# Patient Record
Sex: MALE | Race: WHITE
[De-identification: names, ages, dates, MRNs, and addresses within clinical notes are randomized per-mention and may not be internally consistent; named-entity substitution may affect disease eponyms.]

---

## 2019-07-24 ENCOUNTER — HOSPITAL ENCOUNTER (EMERGENCY)
Dept: HOSPITAL 44 - ED | Age: 44
Discharge: HOME | End: 2019-07-24
Payer: COMMERCIAL

## 2019-07-24 VITALS — SYSTOLIC BLOOD PRESSURE: 150 MMHG | DIASTOLIC BLOOD PRESSURE: 88 MMHG

## 2019-07-24 DIAGNOSIS — N20.0: Primary | ICD-10-CM

## 2019-07-24 LAB
BASOPHILS NFR BLD: 0.6 % (ref 0–1.5)
EGFR (NON-AFRICAN): > 60
MCV RBC AUTO: 87 FL (ref 80–100)
NEUTROPHILS #: 5.5 # K/UL (ref 1.4–7.7)

## 2019-07-24 PROCEDURE — 80053 COMPREHEN METABOLIC PANEL: CPT

## 2019-07-24 PROCEDURE — 96360 HYDRATION IV INFUSION INIT: CPT

## 2019-07-24 PROCEDURE — 99284 EMERGENCY DEPT VISIT MOD MDM: CPT

## 2019-07-24 PROCEDURE — 85025 COMPLETE CBC W/AUTO DIFF WBC: CPT

## 2019-07-24 PROCEDURE — 96372 THER/PROPH/DIAG INJ SC/IM: CPT

## 2019-07-24 PROCEDURE — 74176 CT ABD & PELVIS W/O CONTRAST: CPT

## 2019-07-24 PROCEDURE — S1016 NON-PVC INTRAVENOUS ADMINIST: HCPCS

## 2019-07-24 NOTE — ED PHYSICIAN DOCUMENTATION
Low Back Pain





- HISTORIAN


Historian: patient





- HPI


Stated Complaint: back pain


Chief Complaint: Low Back Pain/ Injury


Additional Information: 





Patient presents to ED with a 2 hour history of right sided back pain with 

associated nausea/dry heaves and loose stools.  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  He had to stop several times with 

nausea/loose stools.  He rates his pain 10/10, sharp, crampy.  Nothing makes the

pain better or worse.  


Onset: hours (2)


Duration: continues in ED


Recent Injury: No


Where: work


Severity: severe


Quality: sharp, other (crampy)


Associated Symptoms: sweating, nausea, vomiting, other (loose stools).  denies: 

fever, chills


Relieved By: nothing





- ROS


CONST: no problems


CVS/RESP: denies: chest pain, shortness of breath


EYES/ENT: none


MS/SKIN/LYMPH: none


Neuro/Psych: none





- PAST HX


Past History: denies: back injury


Surgeries/Procedures: none


Allergies/Adverse Reactions: 


                                    Allergies











Allergy/AdvReac Type Severity Reaction Status Date / Time


 


Penicillins AdvReac  Chest Pain Verified 07/24/19 14:05














Home Medications: 


                                Ambulatory Orders











 Medication  Instructions  Recorded


 


Fluconazole [Diflucan] 150 mg PO 07/24/19


 


Ketorolac Tromethamine [Toradol] 10 mg PO Q6H PRN #20 tablet 07/24/19


 


Ondansetron HCl Rapdis [Zofran Odt] 4 mg PO Q8 #20 tab 07/24/19


 


Tamsulosin HCl [Flomax] 0.4 mg PO DAILY #20 cap.er.24h 07/24/19














- SOCIAL HX


Smoking History: non-smoker


Alcohol Use: none


Drug Use: none





- FAMILY HX


Family History: none, no significant history





- VITAL SIGNS


Vital Signs: 


                                   Vital Signs











Temp Pulse Resp BP Pulse Ox


 


 97.2 F L  61   20   167/99    


 


 07/24/19 13:53  07/24/19 13:53  07/24/19 13:53  07/24/19 13:53   














- REVIEWED ASSESSMENTS


Nursing Assessment  Reviewed: Yes


Vitals Reviewed: Yes





ED Results Lab/Radiology





- Lab Results


Lab Results: 


                                   Lab Results











  07/24/19 07/24/19





  14:15 14:15


 


WBC  9.00 K/ul K/ul  





   (4.00-12.00)  


 


RBC  5.57 M/ul H M/ul  





   (3.90-5.20)  


 


Hgb  16.5 g/dL g/dL  





   (12.0-18.0)  


 


Hct  48.5 % %  





   (37.0-53.0)  


 


MCV  87.0 fl fl  





   (80.0-100.0)  


 


MCH  29.6 pg pg  





   (28.0-34.0)  


 


MCHC  34.0 g/dL g/dL  





   (30.0-36.0)  


 


RDW  14.0 % %  





   (11.3-14.3)  


 


Plt Count  328 K/mm3 K/mm3  





   (130-400)  


 


Neut % (Auto)  60.9 % %  





   (39.0-79.0)  


 


Lymph % (Auto)  29.4 % %  





   (16.0-50.0)  


 


Mono % (Auto)  6.6 % %  





   (0.0-11.0)  


 


Eos % (Auto)  2.5 % %  





   (0.0-6.8)  


 


Baso % (Auto)  0.6 % %  





   (0.0-1.5)  


 


Neut # (Auto)  5.5 # k/uL # k/uL  





   (1.4-7.7)  


 


Lymph # (Auto)  2.7 # k/uL # k/uL  





   (0.6-4.0)  


 


Mono # (Auto)  0.6 # k/uL # k/uL  





   (0.0-0.9)  


 


Eos # (Auto)  0.2 # k/uL # k/uL  





   (0.0-0.6)  


 


Baso # (Auto)  0.1 # k/uL # k/uL  





   (0.0-0.5)  


 


Sodium    143 mmol/L mmol/L





    (137-145) 


 


Potassium    3.7 mmol/L mmol/L





    (3.5-5.1) 


 


Chloride    107 mmol/L mmol/L





    () 


 


Carbon Dioxide    27 mmol/L mmol/L





    (22-30) 


 


Anion Gap    12.7 mmol/L H mmol/L





    (3-11) 


 


BUN    11 mg/dL mg/dL





    (9-20) 


 


Creatinine    1.24 mg/dL mg/dL





    (0.66-1.25) 


 


Estimated Creat Clear    186 





   


 


Est GFR ( Amer)    > 60 





   (60 - )


 


Est GFR (Non-Af Amer)    > 60 





   (60 - )


 


Glucose    132 mg/dL H mg/dL





    () 


 


Calcium    9.7 mg/dL mg/dL





    (8.4-10.2) 


 


Total Bilirubin    1.0 mg/dL mg/dL





    (0.2-1.3) 


 


AST    49 U/L H U/L





    (15-46) 


 


ALT    50 U/L U/L





    (13-69) 


 


Alkaline Phosphatase    46 U/L U/L





    () 


 


Total Protein    8.2 g/dL g/dL





    (6.3-8.2) 


 


Albumin    4.9 g/dL g/dL





    (3.5-5.0) 














- Orders


Orders: 


                                    ED Orders











 Category Date Time Status


 


 Place IV Lock 1T Care  07/24/19 14:01 Active


 


 CT ABD & PELVIS W/O CON Stat Exams  07/24/19 Completed


 


 CBC AUTO DIFF Stat Lab  07/24/19 14:15 Completed


 


 CMP Stat Lab  07/24/19 14:15 Completed


 


 0.9 % Sodium Chloride [Normal Saline] 1,000 ml Med  07/24/19 14:01 Discontinued





 IV Q1H   


 


 HYDROmorphone HCL/PF [Dilaudid] Med  07/24/19 14:01 Discontinued





 1 mg IVP NOW ONE   


 


 Ketorolac Tromethamine [Toradol] Med  07/24/19 14:01 Discontinued





 30 mg IV NOW ONE   


 


 Ondansetron HCl/Pf [Zofran] Med  07/24/19 14:06 Discontinued





 4 mg .ROUTE .STK-MED ONE   


 


 Ondansetron HCl/Pf [Zofran] Med  07/24/19 14:06 Discontinued





 4 mg IVP NOW ONE   














Low Back Pain/Injury





- Physical Exam


General Appearance: mild distress


EENT: PACHECO


Neck: non-tender, painless ROM


Resp/CVS: chest non-tender, breath sounds nml, heart sounds nml, reg. rate & 

rhythm


Abdomen: non-tender


Back: CVA tenderness (right)


Straight Leg Raising: Negative Left, Negative Right


Neuro/Psych: oriented x3, motor nml


Skin: warm/dry, normal color


Extremities: non-tender, no edema





Discharge


Clincal Impression: 


 Right renal stone





Prescriptions: 


Ketorolac Tromethamine [Toradol] 10 mg PO Q6H PRN #20 tablet


 PRN Reason: pain


Ondansetron HCl Rapdis [Zofran Odt] 4 mg PO Q8 #20 tab


Tamsulosin HCl [Flomax] 0.4 mg PO DAILY #20 cap.er.24h


Referrals: 


Primary Doctor,No [Primary Care Provider] - 2 Days


Additional Instructions: 


1.  Toradol every 6 hours as needed for pain


2.  Flomax daily, first dose as soon as possible


3.  Zofran every 8 hours as needed for nausea


4.  Follow up with Urologist as soon as possible.  Call today for an appointment


5.  Follow up with PCP within 3 days


6.  Return to ER for new or worsening symptoms.  


Condition: Stable


Disposition: 01 HOME, SELF-CARE


Decision to Admit: NO


Date of Decison to Admit: 07/24/19


Decision Time: 15:26

## 2019-07-24 NOTE — DIAGNOSTIC IMAGING REPORT
JARRED TORRES 

[facility]

[id number] [facility] [address]

[place], [place]. [address]

 

 

 

 

Report Submission Date: 2019 2:50:06 PM CDT

Patient       Study

Name:   DREW IRBY       Date:   2019 2:21:18 PM CDT

MRN:   [id number]       Modality Type:   CT\SR

Gender:   M       Description:   CT ABD PELVIS W/O CO

:   79       Institution:   [facility]

Physician:   JARRED TORRES

     Accession:    [id number]

 

 

Exam:  CT abdomen and pelvis without contrast. 



History:  Right flank pain. 



Axial images through the abdomen and pelvis without oral or IV contrast is 
submitted along with sagittal and coronal reformatted images. 



The visualized lower lung fields are clear.  No free intraperitoneal air is 
identified. 



The gallbladder is distended without stones.  The liver, spleen and pancreas are
normal attenuation.  The adrenal glands are normal configuration.  The abdominal
aorta is of normal caliber.  No periaortic lymphadenopathy is identified. 



A right-sided hydronephrosis is identified.  A 5.8 mm stone at the ureteropelvic
junction on the right side is noted.  No left-sided hydronephrosis is seen.  No 
left renal or ureteral stones are identified.  The urinary bladder is distended 
without trabeculation.  Bilateral inguinal hernias are noted containing only 
fat. 



The small bowel is of normal caliber.  Air and stool seen throughout the large 
intestine.  The appendix is of normal configuration.  No inflammatory changes in
the mesentery or ascites is identified.  No bony abnormalities are identified. 



Impression: 

Right-sided hydronephrosis secondary to a 5.8 mm ureteropelvic junction stone is
noted.   

Nonspecific bowel gas pattern.   

No inflammatory changes in the mesentery or ascites is identified.

 

Electronically signed on 2019 2:50:06 PM CDT by:

Jorge MAHAJAN